# Patient Record
Sex: FEMALE | Race: BLACK OR AFRICAN AMERICAN | Employment: UNEMPLOYED | URBAN - METROPOLITAN AREA
[De-identification: names, ages, dates, MRNs, and addresses within clinical notes are randomized per-mention and may not be internally consistent; named-entity substitution may affect disease eponyms.]

---

## 2017-08-19 ENCOUNTER — OFFICE VISIT (OUTPATIENT)
Dept: FAMILY MEDICINE CLINIC | Age: 42
End: 2017-08-19

## 2017-08-19 VITALS
WEIGHT: 180 LBS | TEMPERATURE: 98.2 F | BODY MASS INDEX: 30.73 KG/M2 | HEIGHT: 64 IN | SYSTOLIC BLOOD PRESSURE: 105 MMHG | RESPIRATION RATE: 16 BRPM | DIASTOLIC BLOOD PRESSURE: 72 MMHG | OXYGEN SATURATION: 98 % | HEART RATE: 81 BPM

## 2017-08-19 DIAGNOSIS — J45.909 REACTIVE AIRWAY DISEASE, UNSPECIFIED ASTHMA SEVERITY, UNCOMPLICATED: ICD-10-CM

## 2017-08-19 DIAGNOSIS — J40 BRONCHITIS: Primary | ICD-10-CM

## 2017-08-19 RX ORDER — PREDNISONE 20 MG/1
20 TABLET ORAL 3 TIMES DAILY
Qty: 21 TAB | Refills: 0 | Status: SHIPPED | OUTPATIENT
Start: 2017-08-19 | End: 2019-05-28 | Stop reason: ALTCHOICE

## 2017-08-19 RX ORDER — BENZONATATE 200 MG/1
200 CAPSULE ORAL
Qty: 21 CAP | Refills: 0 | Status: SHIPPED | OUTPATIENT
Start: 2017-08-19 | End: 2017-08-26

## 2017-08-19 RX ORDER — LORATADINE 10 MG/1
10 TABLET ORAL
COMMUNITY
End: 2020-09-21 | Stop reason: ALTCHOICE

## 2017-08-19 RX ORDER — AZITHROMYCIN 250 MG/1
TABLET, FILM COATED ORAL
Qty: 6 TAB | Refills: 0 | Status: SHIPPED | OUTPATIENT
Start: 2017-08-19 | End: 2017-08-24

## 2017-08-19 NOTE — PROGRESS NOTES
HISTORY OF PRESENT ILLNESS  Nando Womack is a 35 y.o. female. HPI   This 35year old lady complains of a 1 week hx of cough productive of yellowish sputum. .  She is a smoker and has reactive airway disease. she has been wheezing sometimes but states the albuterol helps. No relief with otc meds    Review of Systems   Constitutional: Negative for fever. HENT: Negative for congestion. Respiratory: Positive for cough, sputum production and wheezing. Negative for shortness of breath. Cardiovascular: Negative for chest pain. Musculoskeletal: Negative for myalgias. Physical Exam   Constitutional: She appears well-developed and well-nourished. No distress. HENT:   Right Ear: External ear normal.   Left Ear: External ear normal.   Nose: Nose normal.   Mouth/Throat: Oropharynx is clear and moist. No oropharyngeal exudate. Eyes: Pupils are equal, round, and reactive to light. Cardiovascular: Normal rate, regular rhythm and normal heart sounds. No murmur heard. Pulmonary/Chest: Effort normal. She has wheezes (end exp wheezing with good air movement). She has no rales. Abdominal: Soft. There is no tenderness. Skin: She is not diaphoretic. ASSESSMENT and PLAN    ICD-10-CM ICD-9-CM    1. Bronchitis J40 490    2. Reactive airway disease, unspecified asthma severity, uncomplicated H25.711 824.68    will start on antibiotics-zpak  Prednisone  Albuterol.   Precautions given

## 2018-10-12 ENCOUNTER — HOSPITAL ENCOUNTER (EMERGENCY)
Age: 43
Discharge: HOME | End: 2018-10-12
Attending: STUDENT IN AN ORGANIZED HEALTH CARE EDUCATION/TRAINING PROGRAM
Payer: MEDICAID

## 2018-10-12 ENCOUNTER — APPOINTMENT (OUTPATIENT)
Dept: CT IMAGING | Age: 43
End: 2018-10-12
Attending: STUDENT IN AN ORGANIZED HEALTH CARE EDUCATION/TRAINING PROGRAM
Payer: MEDICAID

## 2018-10-12 VITALS
RESPIRATION RATE: 18 BRPM | HEIGHT: 64 IN | OXYGEN SATURATION: 100 % | BODY MASS INDEX: 32.1 KG/M2 | DIASTOLIC BLOOD PRESSURE: 98 MMHG | TEMPERATURE: 98.3 F | SYSTOLIC BLOOD PRESSURE: 179 MMHG | HEART RATE: 62 BPM | WEIGHT: 188 LBS

## 2018-10-12 DIAGNOSIS — R10.84 ABDOMINAL PAIN, GENERALIZED: Primary | ICD-10-CM

## 2018-10-12 LAB
ALBUMIN SERPL-MCNC: 3.4 G/DL (ref 3.5–5)
ALBUMIN/GLOB SERPL: 0.9 {RATIO} (ref 1.1–2.2)
ALP SERPL-CCNC: 53 U/L (ref 45–117)
ALT SERPL-CCNC: 18 U/L (ref 12–78)
AMPHET UR QL SCN: NEGATIVE
ANION GAP SERPL CALC-SCNC: 8 MMOL/L (ref 5–15)
APPEARANCE UR: CLEAR
AST SERPL-CCNC: 19 U/L (ref 15–37)
BACTERIA URNS QL MICRO: NEGATIVE /HPF
BARBITURATES UR QL SCN: NEGATIVE
BASOPHILS # BLD: 0.1 K/UL (ref 0–0.1)
BASOPHILS NFR BLD: 1 % (ref 0–1)
BENZODIAZ UR QL: POSITIVE
BILIRUB SERPL-MCNC: 0.3 MG/DL (ref 0.2–1)
BILIRUB UR QL: NEGATIVE
BUN SERPL-MCNC: 12 MG/DL (ref 6–20)
BUN/CREAT SERPL: 16 (ref 12–20)
CALCIUM SERPL-MCNC: 8.8 MG/DL (ref 8.5–10.1)
CANNABINOIDS UR QL SCN: POSITIVE
CHLORIDE SERPL-SCNC: 105 MMOL/L (ref 97–108)
CO2 SERPL-SCNC: 26 MMOL/L (ref 21–32)
COCAINE UR QL SCN: NEGATIVE
COLOR UR: ABNORMAL
COMMENT, HOLDF: NORMAL
CREAT SERPL-MCNC: 0.77 MG/DL (ref 0.55–1.02)
DIFFERENTIAL METHOD BLD: ABNORMAL
DRUG SCRN COMMENT,DRGCM: ABNORMAL
EOSINOPHIL # BLD: 0.2 K/UL (ref 0–0.4)
EOSINOPHIL NFR BLD: 3 % (ref 0–7)
EPITH CASTS URNS QL MICRO: ABNORMAL /LPF
ERYTHROCYTE [DISTWIDTH] IN BLOOD BY AUTOMATED COUNT: 14 % (ref 11.5–14.5)
GLOBULIN SER CALC-MCNC: 3.8 G/DL (ref 2–4)
GLUCOSE SERPL-MCNC: 89 MG/DL (ref 65–100)
GLUCOSE UR STRIP.AUTO-MCNC: NEGATIVE MG/DL
HCG UR QL: NEGATIVE
HCT VFR BLD AUTO: 39.1 % (ref 35–47)
HGB BLD-MCNC: 12.7 G/DL (ref 11.5–16)
HGB UR QL STRIP: ABNORMAL
IMM GRANULOCYTES # BLD: 0 K/UL (ref 0–0.04)
IMM GRANULOCYTES NFR BLD AUTO: 0 % (ref 0–0.5)
KETONES UR QL STRIP.AUTO: NEGATIVE MG/DL
LEUKOCYTE ESTERASE UR QL STRIP.AUTO: ABNORMAL
LIPASE SERPL-CCNC: 133 U/L (ref 73–393)
LYMPHOCYTES # BLD: 3.5 K/UL (ref 0.8–3.5)
LYMPHOCYTES NFR BLD: 53 % (ref 12–49)
MCH RBC QN AUTO: 27.8 PG (ref 26–34)
MCHC RBC AUTO-ENTMCNC: 32.5 G/DL (ref 30–36.5)
MCV RBC AUTO: 85.6 FL (ref 80–99)
METHADONE UR QL: NEGATIVE
MONOCYTES # BLD: 0.5 K/UL (ref 0–1)
MONOCYTES NFR BLD: 7 % (ref 5–13)
MUCOUS THREADS URNS QL MICRO: ABNORMAL /LPF
NEUTS SEG # BLD: 2.4 K/UL (ref 1.8–8)
NEUTS SEG NFR BLD: 36 % (ref 32–75)
NITRITE UR QL STRIP.AUTO: NEGATIVE
NRBC # BLD: 0 K/UL (ref 0–0.01)
NRBC BLD-RTO: 0 PER 100 WBC
OPIATES UR QL: POSITIVE
PCP UR QL: NEGATIVE
PH UR STRIP: 6 [PH] (ref 5–8)
PLATELET # BLD AUTO: 199 K/UL (ref 150–400)
PMV BLD AUTO: 12.9 FL (ref 8.9–12.9)
POTASSIUM SERPL-SCNC: 4.2 MMOL/L (ref 3.5–5.1)
PROT SERPL-MCNC: 7.2 G/DL (ref 6.4–8.2)
PROT UR STRIP-MCNC: NEGATIVE MG/DL
RBC # BLD AUTO: 4.57 M/UL (ref 3.8–5.2)
RBC #/AREA URNS HPF: ABNORMAL /HPF (ref 0–5)
SAMPLES BEING HELD,HOLD: NORMAL
SODIUM SERPL-SCNC: 139 MMOL/L (ref 136–145)
SP GR UR REFRACTOMETRY: 1.03 (ref 1–1.03)
UR CULT HOLD, URHOLD: NORMAL
UROBILINOGEN UR QL STRIP.AUTO: 1 EU/DL (ref 0.2–1)
WBC # BLD AUTO: 6.6 K/UL (ref 3.6–11)
WBC URNS QL MICRO: ABNORMAL /HPF (ref 0–4)

## 2018-10-12 PROCEDURE — 99284 EMERGENCY DEPT VISIT MOD MDM: CPT

## 2018-10-12 PROCEDURE — 80307 DRUG TEST PRSMV CHEM ANLYZR: CPT | Performed by: STUDENT IN AN ORGANIZED HEALTH CARE EDUCATION/TRAINING PROGRAM

## 2018-10-12 PROCEDURE — 83690 ASSAY OF LIPASE: CPT | Performed by: STUDENT IN AN ORGANIZED HEALTH CARE EDUCATION/TRAINING PROGRAM

## 2018-10-12 PROCEDURE — 96374 THER/PROPH/DIAG INJ IV PUSH: CPT

## 2018-10-12 PROCEDURE — 36415 COLL VENOUS BLD VENIPUNCTURE: CPT | Performed by: STUDENT IN AN ORGANIZED HEALTH CARE EDUCATION/TRAINING PROGRAM

## 2018-10-12 PROCEDURE — 81001 URINALYSIS AUTO W/SCOPE: CPT | Performed by: STUDENT IN AN ORGANIZED HEALTH CARE EDUCATION/TRAINING PROGRAM

## 2018-10-12 PROCEDURE — 96361 HYDRATE IV INFUSION ADD-ON: CPT

## 2018-10-12 PROCEDURE — 80053 COMPREHEN METABOLIC PANEL: CPT | Performed by: STUDENT IN AN ORGANIZED HEALTH CARE EDUCATION/TRAINING PROGRAM

## 2018-10-12 PROCEDURE — 74011250636 HC RX REV CODE- 250/636: Performed by: STUDENT IN AN ORGANIZED HEALTH CARE EDUCATION/TRAINING PROGRAM

## 2018-10-12 PROCEDURE — 81025 URINE PREGNANCY TEST: CPT

## 2018-10-12 PROCEDURE — 74177 CT ABD & PELVIS W/CONTRAST: CPT

## 2018-10-12 PROCEDURE — 74011636320 HC RX REV CODE- 636/320: Performed by: RADIOLOGY

## 2018-10-12 PROCEDURE — 85025 COMPLETE CBC W/AUTO DIFF WBC: CPT | Performed by: STUDENT IN AN ORGANIZED HEALTH CARE EDUCATION/TRAINING PROGRAM

## 2018-10-12 PROCEDURE — 74011250636 HC RX REV CODE- 250/636: Performed by: EMERGENCY MEDICINE

## 2018-10-12 PROCEDURE — 93005 ELECTROCARDIOGRAM TRACING: CPT

## 2018-10-12 RX ORDER — KETOROLAC TROMETHAMINE 30 MG/ML
30 INJECTION, SOLUTION INTRAMUSCULAR; INTRAVENOUS
Status: COMPLETED | OUTPATIENT
Start: 2018-10-12 | End: 2018-10-12

## 2018-10-12 RX ADMIN — SODIUM CHLORIDE 1000 ML: 900 INJECTION, SOLUTION INTRAVENOUS at 16:23

## 2018-10-12 RX ADMIN — KETOROLAC TROMETHAMINE 30 MG: 30 INJECTION, SOLUTION INTRAMUSCULAR at 18:34

## 2018-10-12 RX ADMIN — IOPAMIDOL 100 ML: 755 INJECTION, SOLUTION INTRAVENOUS at 20:09

## 2018-10-12 NOTE — ED PROVIDER NOTES
HPI Comments: 37 y.o. female with past medical history significant for anxiety and bipolar 1 disorder who presents via EMS from home with chief complaint of abdominal pain. Patient reports right side abdominal pain since 1 month ago. She was admitted to [facility] for 1 week and was discharged yesterday without pain medications. She currently complains of right sided abdominal pain that radiates to her left when she takes a deep breath. Patient also notes a little vaginal discharge. Patient was given 100 mcg of Fentanyl en route. Of note, patient has a history of cysts which were found by laparoscopies and states that she is concerned about a ruptured cyst. There are no other acute medical concerns at this time. Social hx: Current tobacco use; denies alcohol use; denies illicit drug use PCP: Anyi Canales MD 
 
Note written by Riya Juarez, as dictated by Debi Cohen MD 2:05 PM 
 
 
The history is provided by the patient and the EMS personnel. No  was used. Past Medical History:  
Diagnosis Date  Bipolar 1 disorder ([facility] [address]  Moderate anxiety Past Surgical History:  
Procedure Laterality Date  HX  SECTION    
 HX  SECTION    
 HX  SECTION No family history on file. Social History Social History  Marital status: UNKNOWN Spouse name: N/A  
 Number of children: N/A  
 Years of education: N/A Occupational History  Not on file. Social History Main Topics  Smoking status: Current Some Day Smoker Types: Cigarettes  Smokeless tobacco: Never Used  Alcohol use No  
 Drug use: No  
 Sexual activity: Not on file Other Topics Concern  Not on file Social History Narrative ALLERGIES: Codeine; Dilaudid [hydromorphone (pf)]; Pcn [penicillins]; and Sulfa (sulfonamide antibiotics) Review of Systems Constitutional: Negative for activity change, diaphoresis, fatigue and fever.  
HENT: Negative for congestion and sore throat. Eyes: Negative for photophobia and visual disturbance. Respiratory: Negative for chest tightness and shortness of breath. Cardiovascular: Negative for chest pain, palpitations and leg swelling. Gastrointestinal: Positive for abdominal pain. Negative for blood in stool, constipation, diarrhea, nausea and vomiting. Genitourinary: Positive for vaginal discharge. Negative for difficulty urinating, dysuria, flank pain, frequency and hematuria. Musculoskeletal: Negative for back pain. Neurological: Negative for dizziness, syncope, numbness and headaches. All other systems reviewed and are negative. Vitals:  
 10/12/18 1511 10/12/18 1514 BP:  128/72 Pulse:  62 Resp:  18 Temp:  98.3 °F (36.8 °C) SpO2:  100% Weight:  85.3 kg (188 lb) Height: 5' 5\" (1.651 m) 5' 4\" (1.626 m) Physical Exam  
Constitutional: She is oriented to person, place, and time. She appears well-developed and well-nourished. No distress. HENT:  
Head: Normocephalic and atraumatic. Nose: Nose normal.  
Mouth/Throat: Oropharynx is clear and moist. No oropharyngeal exudate. Eyes: Conjunctivae and EOM are normal. Pupils are equal, round, and reactive to light. Right eye exhibits no discharge. Left eye exhibits no discharge. No scleral icterus. Neck: Normal range of motion. Neck supple. No JVD present. No tracheal deviation present. No thyromegaly present. Cardiovascular: Normal rate, regular rhythm, normal heart sounds and intact distal pulses. Exam reveals no gallop and no friction rub. No murmur heard. Pulmonary/Chest: Effort normal and breath sounds normal. No stridor. No respiratory distress. She has no wheezes. She has no rales. She exhibits no tenderness. Abdominal: Soft. Bowel sounds are normal. She exhibits no distension and no mass. There is no tenderness. There is no rebound. Musculoskeletal: Normal range of motion. She exhibits no edema or tenderness. Lymphadenopathy:  
  She has no cervical adenopathy. Neurological: She is alert and oriented to person, place, and time. No cranial nerve deficit. Coordination normal.  
Skin: Skin is warm and dry. No rash noted. She is not diaphoretic. No erythema. No pallor. Psychiatric: She has a normal mood and affect. Her behavior is normal. Judgment and thought content normal.  
Nursing note and vitals reviewed. MDM Number of Diagnoses or Management Options Abdominal pain, generalized:  
  
Amount and/or Complexity of Data Reviewed Clinical lab tests: reviewed and ordered Tests in the radiology section of CPT®: ordered and reviewed Review and summarize past medical records: yes Discuss the patient with other providers: yes Independent visualization of images, tracings, or specimens: yes Risk of Complications, Morbidity, and/or Mortality Presenting problems: moderate Diagnostic procedures: moderate Management options: moderate Patient Progress Patient progress: stable ED Course Procedures 7:17 PM 
Change of shift. Care of patient taken over from Dr. Americo Mccoy to Dr. Fatemeh Randall; H&P reviewed, bedside handoff complete. Awaiting CT and discharge. 7:11 PM 
CT is ok, pt is angry that she is not getting stronger pain medication, will f/u with PCP and GYN. Discharge per Dr. Geraldine Kirkland. DISCHARGE NOTE 
7:14 PM 
Patient's results have been reviewed with them. Patient and/or family have verbally conveyed their understanding and agreement of the patient's signs, symptoms, diagnosis, treatment and prognosis and additionally agree to follow up as recommended or return to the Emergency Room should their condition change prior to follow-up.   Discharge instructions have also been provided to the patient with some educational information regarding their diagnosis as well a list of reasons why they would want to return to the ER prior to their follow-up appointment should their condition change.

## 2018-10-12 NOTE — DISCHARGE INSTRUCTIONS

## 2018-10-12 NOTE — ED TRIAGE NOTES
Pt went to [facility] ER last night with c/o pain in her lower right quadrant that radiates to her back. States has hx of fibroids and has been on Morphine since last Tuesday for it, stopped today. [facility] fibroids are not able to be seen with regular US.

## 2018-10-12 NOTE — ED NOTES
Pt screaming on call bell and can be heard screaming in the department at me on the call bell. The screaming was so loud I could not understand her on the call bell. Immediately notified Gaby Weeks  and we went into pt room. Pt proceeded to scream and yell and excessively verbally aggressive. Pt asked not to yell and pt continued to. Pt demanding pain medication. Pt demanding to see the dr. Eliud Rees physician currently with a critical pt. Pt made aware of situation of physician with critical pt, pt stated \"well my pain is more important then someone dying\". Pt continues to yell and demand the doctor to come in.

## 2018-10-12 NOTE — ED NOTES
Provider has reviewed discharge instructions with the patient. The patient verbalized understanding.

## 2018-10-14 LAB
ATRIAL RATE: 60 BPM
CALCULATED P AXIS, ECG09: 51 DEGREES
CALCULATED R AXIS, ECG10: 8 DEGREES
CALCULATED T AXIS, ECG11: 2 DEGREES
DIAGNOSIS, 93000: NORMAL
P-R INTERVAL, ECG05: 136 MS
Q-T INTERVAL, ECG07: 430 MS
QRS DURATION, ECG06: 68 MS
QTC CALCULATION (BEZET), ECG08: 430 MS
VENTRICULAR RATE, ECG03: 60 BPM

## 2018-11-15 ENCOUNTER — ANESTHESIA EVENT (OUTPATIENT)
Dept: ENDOSCOPY | Age: 43
End: 2018-11-15
Payer: MEDICAID

## 2018-11-16 ENCOUNTER — HOSPITAL ENCOUNTER (OUTPATIENT)
Age: 43
Setting detail: OUTPATIENT SURGERY
Discharge: HOME | End: 2018-11-16
Attending: INTERNAL MEDICINE | Admitting: INTERNAL MEDICINE
Payer: MEDICAID

## 2018-11-16 ENCOUNTER — ANESTHESIA (OUTPATIENT)
Dept: ENDOSCOPY | Age: 43
End: 2018-11-16
Payer: MEDICAID

## 2018-11-16 VITALS
RESPIRATION RATE: 15 BRPM | TEMPERATURE: 97.6 F | OXYGEN SATURATION: 100 % | SYSTOLIC BLOOD PRESSURE: 128 MMHG | DIASTOLIC BLOOD PRESSURE: 94 MMHG | HEART RATE: 70 BPM

## 2018-11-16 PROCEDURE — 77030027957 HC TBNG IRR ENDOGTR BUSS -B: Performed by: INTERNAL MEDICINE

## 2018-11-16 PROCEDURE — 76060000031 HC ANESTHESIA FIRST 0.5 HR: Performed by: INTERNAL MEDICINE

## 2018-11-16 PROCEDURE — 74011250636 HC RX REV CODE- 250/636

## 2018-11-16 PROCEDURE — 76040000019: Performed by: INTERNAL MEDICINE

## 2018-11-16 PROCEDURE — 77030009426 HC FCPS BIOP ENDOSC BSC -B: Performed by: INTERNAL MEDICINE

## 2018-11-16 PROCEDURE — 88305 TISSUE EXAM BY PATHOLOGIST: CPT

## 2018-11-16 RX ORDER — NALOXONE HYDROCHLORIDE 0.4 MG/ML
0.4 INJECTION, SOLUTION INTRAMUSCULAR; INTRAVENOUS; SUBCUTANEOUS
Status: DISCONTINUED | OUTPATIENT
Start: 2018-11-16 | End: 2018-11-16 | Stop reason: HOSPADM

## 2018-11-16 RX ORDER — FENTANYL CITRATE 50 UG/ML
100 INJECTION, SOLUTION INTRAMUSCULAR; INTRAVENOUS
Status: DISCONTINUED | OUTPATIENT
Start: 2018-11-16 | End: 2018-11-16 | Stop reason: HOSPADM

## 2018-11-16 RX ORDER — SODIUM CHLORIDE 9 MG/ML
INJECTION, SOLUTION INTRAVENOUS
Status: DISCONTINUED | OUTPATIENT
Start: 2018-11-16 | End: 2018-11-16 | Stop reason: HOSPADM

## 2018-11-16 RX ORDER — SODIUM CHLORIDE 9 MG/ML
50 INJECTION, SOLUTION INTRAVENOUS CONTINUOUS
Status: DISCONTINUED | OUTPATIENT
Start: 2018-11-16 | End: 2018-11-16 | Stop reason: HOSPADM

## 2018-11-16 RX ORDER — SODIUM CHLORIDE 0.9 % (FLUSH) 0.9 %
5-10 SYRINGE (ML) INJECTION AS NEEDED
Status: DISCONTINUED | OUTPATIENT
Start: 2018-11-16 | End: 2018-11-16 | Stop reason: HOSPADM

## 2018-11-16 RX ORDER — EPINEPHRINE 0.1 MG/ML
1 INJECTION INTRACARDIAC; INTRAVENOUS
Status: DISCONTINUED | OUTPATIENT
Start: 2018-11-16 | End: 2018-11-16 | Stop reason: HOSPADM

## 2018-11-16 RX ORDER — MIDAZOLAM HYDROCHLORIDE 1 MG/ML
.25-1 INJECTION, SOLUTION INTRAMUSCULAR; INTRAVENOUS
Status: DISCONTINUED | OUTPATIENT
Start: 2018-11-16 | End: 2018-11-16 | Stop reason: HOSPADM

## 2018-11-16 RX ORDER — PROPOFOL 10 MG/ML
INJECTION, EMULSION INTRAVENOUS AS NEEDED
Status: DISCONTINUED | OUTPATIENT
Start: 2018-11-16 | End: 2018-11-16 | Stop reason: HOSPADM

## 2018-11-16 RX ORDER — ALPRAZOLAM 0.5 MG/1
0.5 TABLET ORAL AS NEEDED
COMMUNITY

## 2018-11-16 RX ORDER — SODIUM CHLORIDE 0.9 % (FLUSH) 0.9 %
5-10 SYRINGE (ML) INJECTION EVERY 8 HOURS
Status: DISCONTINUED | OUTPATIENT
Start: 2018-11-16 | End: 2018-11-16 | Stop reason: HOSPADM

## 2018-11-16 RX ORDER — DEXTROMETHORPHAN/PSEUDOEPHED 2.5-7.5/.8
1.2 DROPS ORAL
Status: DISCONTINUED | OUTPATIENT
Start: 2018-11-16 | End: 2018-11-16 | Stop reason: HOSPADM

## 2018-11-16 RX ORDER — ATROPINE SULFATE 0.1 MG/ML
0.5 INJECTION INTRAVENOUS
Status: DISCONTINUED | OUTPATIENT
Start: 2018-11-16 | End: 2018-11-16 | Stop reason: HOSPADM

## 2018-11-16 RX ORDER — FLUMAZENIL 0.1 MG/ML
0.2 INJECTION INTRAVENOUS
Status: DISCONTINUED | OUTPATIENT
Start: 2018-11-16 | End: 2018-11-16 | Stop reason: HOSPADM

## 2018-11-16 RX ORDER — TRAMADOL HYDROCHLORIDE 100 MG/1
100 TABLET, FILM COATED, EXTENDED RELEASE ORAL
COMMUNITY
End: 2020-09-21

## 2018-11-16 RX ORDER — LIDOCAINE HYDROCHLORIDE 20 MG/ML
INJECTION, SOLUTION EPIDURAL; INFILTRATION; INTRACAUDAL; PERINEURAL AS NEEDED
Status: DISCONTINUED | OUTPATIENT
Start: 2018-11-16 | End: 2018-11-16 | Stop reason: HOSPADM

## 2018-11-16 RX ADMIN — PROPOFOL 40 MG: 10 INJECTION, EMULSION INTRAVENOUS at 13:58

## 2018-11-16 RX ADMIN — PROPOFOL 40 MG: 10 INJECTION, EMULSION INTRAVENOUS at 13:56

## 2018-11-16 RX ADMIN — SODIUM CHLORIDE: 9 INJECTION, SOLUTION INTRAVENOUS at 13:41

## 2018-11-16 RX ADMIN — LIDOCAINE HYDROCHLORIDE 30 MG: 20 INJECTION, SOLUTION EPIDURAL; INFILTRATION; INTRACAUDAL; PERINEURAL at 13:53

## 2018-11-16 RX ADMIN — PROPOFOL 40 MG: 10 INJECTION, EMULSION INTRAVENOUS at 14:00

## 2018-11-16 RX ADMIN — PROPOFOL 30 MG: 10 INJECTION, EMULSION INTRAVENOUS at 13:53

## 2018-11-16 RX ADMIN — PROPOFOL 90 MG: 10 INJECTION, EMULSION INTRAVENOUS at 13:51

## 2018-11-16 RX ADMIN — PROPOFOL 30 MG: 10 INJECTION, EMULSION INTRAVENOUS at 13:54

## 2018-11-16 NOTE — ANESTHESIA PREPROCEDURE EVALUATION
Anesthetic History No history of anesthetic complications Review of Systems / Medical History Patient summary reviewed, nursing notes reviewed and pertinent labs reviewed Pulmonary Smoker Asthma Neuro/Psych Psychiatric history Cardiovascular Within defined limits GI/Hepatic/Renal 
Within defined limits Endo/Other Within defined limits Other Findings Physical Exam 
 
Airway Mallampati: II 
TM Distance: > 6 cm Neck ROM: normal range of motion Mouth opening: Normal 
 
 Cardiovascular Regular rate and rhythm,  S1 and S2 normal,  no murmur, click, rub, or gallop Dental 
 
Dentition: Caps/crowns Pulmonary Breath sounds clear to auscultation Abdominal 
GI exam deferred Other Findings Anesthetic Plan ASA: 2 Anesthesia type: MAC Anesthetic plan and risks discussed with: Patient

## 2018-11-16 NOTE — H&P
[address] [facility], [place]. 
[address] [place], [address] 
[phone] History and Physical  
 
NAME: Randolph :  1975 MRN:  [id number] HPI:  The patient was seen and examined. Past Surgical History:  
Procedure Laterality Date  HX  SECTION    
 HX  SECTION    
 HX  SECTION Past Medical History:  
Diagnosis Date  Bipolar 1 disorder ([facility] [address]  Moderate anxiety Social History Tobacco Use  Smoking status: Current Some Day Smoker Types: Cigarettes  Smokeless tobacco: Never Used Substance Use Topics  Alcohol use: No  
  Alcohol/week: 0.0 oz  Drug use: No  
 
Allergies Allergen Reactions  Codeine Hives  Dilaudid [Hydromorphone (Pf)] Nausea and Vomiting  Pcn [Penicillins] Hives  Sulfa (Sulfonamide Antibiotics) Hives No family history on file. No current facility-administered medications for this encounter. Facility-Administered Medications Ordered in Other Encounters Medication Dose Route Frequency  0.9% sodium chloride infusion   IntraVENous CONTINUOUS  
 
 
 
PHYSICAL EXAM: 
General: WD, WN. Alert, cooperative, no acute distress   
HEENT: NC, Atraumatic. PERRLA, EOMI. Anicteric sclerae. Lungs:  CTA Bilaterally. No Wheezing/Rhonchi/Rales. Heart:  Regular  rhythm,  No murmur, No Rubs, No Gallops Abdomen: Soft, Non distended, Non tender.  +Bowel sounds, no HSM Extremities: No c/c/e Neurologic:  CN 2-12 gi, Alert and oriented X 3. No acute neurological distress Psych:   Good insight. Not anxious nor agitated. The heart, lungs and mental status were satisfactory for the administration of MAC sedation and for the procedure. Mallampati score: 2 Assessment: · Abdominal pain Plan:  
· Endoscopic procedure :EGd/colon · MAC sedation

## 2018-11-16 NOTE — ROUTINE PROCESS
Randolph 
1975 
[id number] Situation: 
Verbal report received from: [place] RN Procedure: Procedure(s): ESOPHAGOGASTRODUODENOSCOPY (EGD) ESOPHAGOGASTRODUODENAL (EGD) BIOPSY 
SIGMOIDOSCOPY FLEXIBLE Background: 
 
Preoperative diagnosis: DYSPHAGIA, PHARYNGOESOPHAGEAL PHASE Postoperative diagnosis: 1.- Gastritis :  Dr. Yoselin Remy Assistant(s): Endoscopy Technician-1: Barbra Kolb Endoscopy RN-1: Susan Morgan RN Specimens:  
ID Type Source Tests Collected by Time Destination 1 : Gastric Biopsy Preservative   Austin Robison MD 11/16/2018 1355 Pathology H. Pylori  no Assessment: 
Intra-procedure medications Anesthesia gave intra-procedure sedation and medications, see anesthesia flow sheet yes Intravenous fluids: NS@ [place] [facility] Vital signs stable Abdominal assessment: round and soft Recommendation: 
Discharge patient per MD order. Family or Friend Permission to share finding with family or friend yes

## 2018-11-16 NOTE — PROCEDURES
[address]  [address], [address]  [phone]                           Colonoscopy and EGD Procedure Note      Indications:  abdominal pain, father with colon caner at age 61     :  Travis Johnson MD    Referring Provider: Jabier Ferguson MD    Sedation:  MAC anesthesia    Procedure Details:  After informed consent was obtained with all risks and benefits of procedure explained and pre-operative exam completed, pt was placed in the left lateral decubitus position. Following sequential administration of sedation as per above, the gastroscope was inserted into the mouth and advanced under direct vision to second portion of the duodenum. A careful inspection was made as the gastroscope was withdrawn, including a retroflexed view of the proximal stomach; findings and interventions are described below. EGD Findings:  Esophagus:normal  Stomach:mild diffuse gastritis, biopsies obtained from antrum and body  Duodenum/jejunum:normal    EGD Interventions:  Gastric biopsies    The bed was then turned and upon sequential sedation as per above, a digital rectal exam was performed per below. The Olympus videocolonoscope was inserted in the rectum and carefully advanced to the rectum/sigmoid. The quality of preparation was poor. [place] Bowel Prep Score  / / / . The colonoscope was slowly withdrawn with careful evaluation between folds. Retroflexion in the rectum was performed. Colon Findings:   Rectum: formed stool  Sigmoid: formed stool    Colonoscopy Interventions:  none           Specimens Removed:    ID Type Source Tests Collected by Time Destination   1 : Gastric Biopsy Preservative   Judith Randall MD 11/16/2018 8433 Pathology       Complications: None. EBL:  None    Impression:    See Postoperative diagnosis above    Recommendations:   - Await pathology. You should receive a letter within 2 weeks.    - Resume normal medications.  - Recommend repeat colonoscopy in 6 months (it is very important that you have clear liquids the day prior to the prep and finish the entire prep). Discharge Disposition:  Home in the company of a  when able to ambulate.     René Cee MD  11/16/2018  2:05 PM

## 2018-11-16 NOTE — DISCHARGE INSTRUCTIONS
[address] [facility] [place].  [address] [facility] [address] [facility], [address]   [phone]                                  Priti Sheehan  [id number]  1975    It was my pleasure seeing you for your procedure. You will also receive a summary report with the findings from this procedure and any further recommendations. If you had polyps removed or biopsies taken during your procedure, you will receive a separate letter from me within the next 2 weeks. If you don't receive this letter or if you have any questions, please call my office [phone]. Please take note of the post procedure instructions listed below. Best Wishes,    Dr. Wood Johnston    These instructions give you information on caring for yourself after your procedure. Call your doctor if you have any problems or questions after your procedure. HOME CARE  · Walk if you have belly cramping or gas. Walking will help get rid of the air and reduce the bloated feeling in your belly (abdomen). · Your IV site (where you received drugs) may be tender to touch. Place warm towels on the site; keep your arm up on two pillows if you have any swelling or soreness in the area. · You may shower. ACTIVITY:  · Take frequent rest periods and move at a slower pace for the next 24 hours. .  · You may resume your regular activity tomorrow if you are feeling back to normal.  · Do not drive or ride a bicycle for at least 24 hours (because of the medicine (anesthesia) used during the test). · Do not sign any important legal documents or use or operate any machinery for 24 hours  · Do not take sleeping medicines/nerve drugs for 24 hours unless the doctor tells you. · You can return to work/school tomorrow unless otherwise instructed. NUTRITION:  · Drink plenty of fluids to keep your pee (urine) clear or pale yellow  · Begin with a light meal and progress to your normal diet.  Heavy or fried foods are harder to digest and may make you feel sick to your stomach (nauseated). · Once you are feeling back to normal, you may resume your normal diet as instructed by your doctor. · Avoid alcoholic beverages for 24 hours or as instructed. IF YOU HAD BIOPSIES TAKEN OR POLYPS REMOVED DURING THE PROCEDURE:  · For the next 7 days, avoid all non-steroidal antiinflammatory medications such as Ibuprofen, Motrin, Advil, Alleve, Shannan-seltzer, Goody's powder, BC powder. · If you do not have an heart condition that requires you to take a daily aspirin, you should avoid taking aspirin for 7 days. · Eat a soft diet for 24 hours. · Monitor your stools for any blood or dark black, tar-like, stools as this may be a sign of bleeding and if you see any blood, notify your doctor immediately. GET HELP RIGHT AWAY AND SEEK IMMEDIATE MEDICAL CARE IF:  · You have more than a spotting of blood in your stool. · You pass clumps of tissue (blood clots) or fill the toilet with blood. · Your belly is painfully swollen or puffy (abdominal distention). · You throw up (vomit). · You have a fever. · You have redness, pain or swelling at the IV site that last greater than two days. · You have abdominal pain or discomfort that is severe or gets worse throughout the day. Post-procedure diagnosis:  1.- Gastritis  Incomplete colonoscopy due to formed stool in rectum    Post-procedure recommendations:  - Await pathology. You should receive a letter within 2 weeks. - Resume normal medications.  - Recommend repeat colonoscopy in 6 months (it is very important that you have clear liquids the day prior to the prep and finish the entire prep). Gastritis: Care Instructions  Your Care Instructions    Gastritis is a sore and upset stomach. It happens when something irritates the stomach lining. Many things can cause it. These include an infection such as the flu or something you ate or drank.  Medicines or a sore on the lining of the stomach (ulcer) also can cause it. Your belly may bloat and ache. You may belch, vomit, and feel sick to your stomach. You should be able to relieve the problem by taking medicine. And it may help to change your diet. If gastritis lasts, your doctor may prescribe medicine. Follow-up care is a key part of your treatment and safety. Be sure to make and go to all appointments, and call your doctor if you are having problems. It's also a good idea to know your test results and keep a list of the medicines you take. How can you care for yourself at home? · If your doctor prescribed antibiotics, take them as directed. Do not stop taking them just because you feel better. You need to take the full course of antibiotics. · Be safe with medicines. If your doctor prescribed medicine to decrease stomach acid, take it as directed. Call your doctor if you think you are having a problem with your medicine. · Do not take any other medicine, including over-the-counter pain relievers, without talking to your doctor first.  · If your doctor recommends over-the-counter medicine to reduce stomach acid, such as Pepcid AC, Prilosec, Tagamet HB, or Zantac 75, follow the directions on the label. · Drink plenty of fluids (enough so that your urine is light yellow or clear like water) to prevent dehydration. Choose water and other caffeine-free clear liquids. If you have kidney, heart, or liver disease and have to limit fluids, talk with your doctor before you increase the amount of fluids you drink. · Limit how much alcohol you drink. · Avoid coffee, tea, cola drinks, chocolate, and other foods with caffeine. They increase stomach acid. When should you call for help? Call 911 anytime you think you may need emergency care.  For example, call if:    · You vomit blood or what looks like coffee grounds.     · You pass maroon or very bloody stools.    Call your doctor now or seek immediate medical care if:    · You start breathing fast and have not produced urine in the last 8 hours.     · You cannot keep fluids down.    Watch closely for changes in your health, and be sure to contact your doctor if:    · You do not get better as expected. Where can you learn more? Go to http://annie-jason.info/. Enter [id number] in the search box to learn more about \"Gastritis: Care Instructions. \"  Current as of: March 28, 2018  Content Version: 11.8  © 3295-4367 Healthwise, [employer]. Care instructions adapted under license by [employer] (which disclaims liability or warranty for this information). If you have questions about a medical condition or this instruction, always ask your healthcare professional. [address] any warranty or liability for your use of this information.

## 2018-11-16 NOTE — ANESTHESIA POSTPROCEDURE EVALUATION
Post-Anesthesia Evaluation and Assessment Patient: Jose Carlos Sweet MRN: [id number]  SSN: [id number] YOB: 1975  Age: 37 y.o. Sex: female I have evaluated the patient and they are stable and ready for discharge from the PACU. Cardiovascular Function/Vital Signs Visit Vitals BP (!) 128/94 Pulse 70 Temp 36.4 °C (97.6 °F) Resp 15 SpO2 100% Breastfeeding? No  
 
 
Patient is status post MAC anesthesia for Procedure(s): ESOPHAGOGASTRODUODENOSCOPY (EGD) ESOPHAGOGASTRODUODENAL (EGD) BIOPSY 
SIGMOIDOSCOPY FLEXIBLE. Nausea/Vomiting: None Postoperative hydration reviewed and adequate. Pain: 
Pain Scale 1: Numeric (0 - 10) (11/16/18 1426) Pain Intensity 1: 8 (11/16/18 1426) Managed Neurological Status: At baseline Mental Status, Level of Consciousness: Alert and  oriented to person, place, and time Pulmonary Status:  
O2 Device: Room air (11/16/18 1426) Adequate oxygenation and airway patent Complications related to anesthesia: None Post-anesthesia assessment completed. No concerns Signed By: Deepika Leach MD   
 November 16, 2018 Procedure(s): ESOPHAGOGASTRODUODENOSCOPY (EGD) ESOPHAGOGASTRODUODENAL (EGD) BIOPSY 
SIGMOIDOSCOPY FLEXIBLE. 
 
<BSHSIANPOST> Visit Vitals BP (!) 128/94 Pulse 70 Temp 36.4 °C (97.6 °F) Resp 15 SpO2 100% Breastfeeding?  No

## 2019-05-02 ENCOUNTER — TELEPHONE (OUTPATIENT)
Dept: FAMILY MEDICINE CLINIC | Age: 44
End: 2019-05-02

## 2019-05-02 ENCOUNTER — OFFICE VISIT (OUTPATIENT)
Dept: FAMILY MEDICINE CLINIC | Age: 44
End: 2019-05-02

## 2019-05-02 VITALS
DIASTOLIC BLOOD PRESSURE: 75 MMHG | TEMPERATURE: 98.3 F | OXYGEN SATURATION: 95 % | HEART RATE: 102 BPM | RESPIRATION RATE: 17 BRPM | BODY MASS INDEX: 33.46 KG/M2 | WEIGHT: 196 LBS | HEIGHT: 64 IN | SYSTOLIC BLOOD PRESSURE: 111 MMHG

## 2019-05-02 DIAGNOSIS — R56.9 SEIZURE (HCC): Primary | ICD-10-CM

## 2019-05-02 DIAGNOSIS — R00.0 TACHYCARDIA: ICD-10-CM

## 2019-05-02 NOTE — LETTER
NOTIFICATION RETURN TO WORK / SCHOOL 
 
5/2/2019 3:37 PM 
 
Ms. Jose Flores [address] To Whom It May Concern: 
 
Jose Flores is currently under the care of [facility]. She was seen today in the office 05/02/2019 If there are questions or concerns please have the patient contact our office.  
 
 
 
Sincerely, 
 
 
Anthony Moreno MD

## 2019-05-02 NOTE — PROGRESS NOTES
Chief Complaint   Patient presents with    Follow-up     Hysterectomy    Tremors     discuss tremors that have occured in the last 3 months since having hysterectomy     Seizure     patient noticed having seizures in the last 3 month since having hysterectomy      Blood pressure 111/75, pulse (!) 111, temperature 98.3 °F (36.8 °C), temperature source Oral, resp. rate 17, height 5' 4\" (1.626 m), weight 196 lb (88.9 kg), last menstrual period 10/16/2018, SpO2 95 %. 1. Have you been to the ER, urgent care clinic since your last visit? Hospitalized since your last visit? No    2. Have you seen or consulted any other health care providers outside of the [address] [place] [address] since your last visit? Include any pap smears or colon screening.  No

## 2019-05-02 NOTE — PATIENT INSTRUCTIONS

## 2019-05-02 NOTE — TELEPHONE ENCOUNTER
Patient have appt with below doctor 5/28/19 and asking if Dr. Charlie Pagan can get her sooner appt.     Office ph [phone]    Procedure Modifiers Provider Requested Approved   [id number] - REFERRAL TO NEUROLOGY None Grisel Cerda MD 1 1   Diagnosis Information     Diagnosis   R56.9 (ICD-10-CM) - [facility]     Pt ph [phone]
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
## 2019-05-02 NOTE — PROGRESS NOTES
\  [address] [facility] [address]   Office [phone], Fax [phone]      Subjective:     Chief Complaint   Patient presents with    Follow-up     Hysterectomy    Tremors     discuss tremors that have occured in the last 3 months since having hysterectomy     Seizure     patient noticed having seizures in the last 3 month since having hysterectomy      History provided by patient    HPI:  Randolph is a 37 y.o. [address] female with significant history of anxiety and Bipolar disorder  presenting for new onset tremors, orgasms and seizures s/p Hysterectomy in 12/2018. Patient stated right sided flank pain for over a year and has been in and out of the hospital until she had total hysterectomy with b/l oophorectomy ( per patient's report)  by at [facility].     Since then, patient is having tremors and sporadic orgasms. She returned back to her gynecologist who prescribed her estrogen. Patient stopped taking estrogen as it was affecting her mood. Subsequently, reported her first seziure episode at the end of February. This was witnessed by her mother and sister which presented us rolling back her eyes with full body shaking and loss of consciousness that lasted about 10 seconds. Denies tounge bitig or incontinence at that time. She did hit her head but did not seek medical attention at the time. Patient states that she continues to have spontaneous tremors, orgasms and seziures since then. Orgasms occur multiple times of a day especially when patient is awake. Seizures occurred 5 times since February and last episode was on April 20th ( about 10 days ago) with similar presentation mentioned above. Patient states that she has 4 kids and don't have time to go to doctors, that's why she did not go to the hospital sooner. She smokes 2 cigars per day. Denies starting new medication.      Dr. Candice Fitzpatrick- psychiatrist.       Review of Systems   Constitutional: Negative for chills and fever. Respiratory: Negative for shortness of breath. Cardiovascular: Negative for chest pain. Gastrointestinal: Negative for abdominal pain, nausea and vomiting. Neurological: Positive for tremors and seizures. Negative for sensory change, focal weakness and weakness. Psychiatric/Behavioral: The patient is nervous/anxious. Past Medical History:   Diagnosis Date    Bipolar 1 disorder ([address]     Moderate anxiety        Current Outpatient Medications on File Prior to Visit   Medication Sig Dispense Refill    ALPRAZolam (XANAX) 0.5 mg tablet Take 0.5 mg by mouth.  traMADol (ULTRAM-ER) 100 mg tablet Take 100 mg by mouth daily as needed for Pain (Every 4-5 hrs).  loratadine (CLARITIN) 10 mg tablet Take 10 mg by mouth.  QUEtiapine (SEROQUEL) 50 mg tablet Take 50 mg by mouth two (2) times a day.  albuterol sulfate (PROVENTIL;VENTOLIN) 2.5 mg/0.5 mL nebu nebulizer solution by Nebulization route once.  predniSONE (DELTASONE) 20 mg tablet Take 1 Tab by mouth three (3) times daily. 21 Tab 0    clonazePAM (KLONOPIN) 1 mg tablet Take  by mouth two (2) times a day.  POTASSIUM CHLORIDE (KLOR-CON 10 PO) Take  by mouth.  guaiFENesin SR (MUCINEX) 600 mg SR tablet Take 1 Tab by mouth two (2) times a day. 30 Tab 0     No current facility-administered medications on file prior to visit.         Allergies   Allergen Reactions    Codeine Hives    Dilaudid [Hydromorphone (Pf)] Nausea and Vomiting    Pcn [Penicillins] Hives    Sulfa (Sulfonamide Antibiotics) Hives       Past Surgical History:   Procedure Laterality Date    FLEXIBLE SIGMOIDOSCOPY N/A 2018    SIGMOIDOSCOPY FLEXIBLE performed by Venita Murdock [facility] at [facility] END[facility]COPY    HX  SECTION      HX  SECTION      HX  SECTION         Social History     Socioeconomic History    Marital status: UNKNOWN     Spouse name: Not on file    Number of children: Not on file    Years of education: Not on file    Highest education level: Not on file   Occupational History    Not on file   Social Needs    Financial resource strain: Not on file    Food insecurity:     Worry: Not on file     Inability: Not on file    Transportation needs:     Medical: Not on file     Non-medical: Not on file   Tobacco Use    Smoking status: Current Some Day Smoker     Types: Cigarettes    Smokeless tobacco: Never Used   Substance and Sexual Activity    Alcohol use: No     Alcohol/week: 0.0 oz    Drug use: No    Sexual activity: Not on file   Lifestyle    Physical activity:     Days per week: Not on file     Minutes per session: Not on file    Stress: Not on file   Relationships    Social connections:     Talks on phone: Not on file     Gets together: Not on file     Attends Yarsanism service: Not on file     Active member of club or organization: Not on file     Attends meetings of clubs or organizations: Not on file     Relationship status: Not on file    Intimate partner violence:     Fear of current or ex partner: Not on file     Emotionally abused: Not on file     Physically abused: Not on file     Forced sexual activity: Not on file   Other Topics Concern    Not on file   Social History Narrative    Not on file       There is no problem list on file for this patient. Objective:   Vitals - reviewed  Visit Vitals  /75   Pulse (!) 102   Temp 98.3 °F (36.8 °C) (Oral)   Resp 17   Ht 5' 4\" (1.626 m)   Wt 196 lb (88.9 kg)   LMP 10/16/2018 Comment: Tubal ligation 11/05/2009   SpO2 95%   BMI 33.64 kg/m²        Physical Exam   Constitutional: She is oriented to person, place, and time. She appears well-developed and well-nourished. HENT:   Head: Normocephalic and atraumatic. Eyes: Pupils are equal, round, and reactive to light. Conjunctivae and EOM are normal.   Neck: Neck supple. Cardiovascular: Normal rate, regular rhythm and normal heart sounds.    Pulmonary/Chest: Effort normal and breath sounds normal. No respiratory distress. Abdominal: Soft. Bowel sounds are normal.   Musculoskeletal: Normal range of motion. She exhibits no tenderness. Neurological: She is alert and oriented to person, place, and time. She has normal strength. No cranial nerve deficit or sensory deficit. She displays a negative Romberg sign. Psychiatric: Her mood appears anxious. She is inattentive. Assessment and orders:       ICD-10-CM ICD-9-CM    1. Seizure ([address] R56.9 780.39 REFERRAL TO NEUROLOGY   2. Tachycardia R00.0 785.0 TSH RFX ON ABNORMAL TO FREE T4     Diagnoses and all orders for this visit:    1. Seizure [facility]): During history and physical patient had spontaneous ? Involuntary generalized body tremor/shakking with eyes closed. Called help and two nurses arrived to assist. Patient continue to have involuntary shaking and unusual noises that lasted about a minute. On recovery, patient stated that she had one of her spontaneous orgasms with tears in her eyes. No post ictal state, no tongue biting or incontinence. Total of 3 episodes of these \"orgasms\" occurred while I was in the room. In regards of seizures: explained risks associated with newly onset seizures including putting her self and others in danger while she is driving. Recommenced immediate hospital follow up given new onset seizure with head trauma. Advised patient to stop driving. However, patient declined. Offered  Neurology follow up and patient accepted. -     REFERRAL TO NEUROLOGY    2. Tachycardia  -     TSH RFX ON ABNORMAL TO FREE T4          Pt was discussed seen with Dr. Jonathan Alvarez  (attending physician). I have reviewed patient medical and social history and medications. I have reviewed pertinent labs results and other data. I have discussed the diagnosis with the patient and the intended plan as seen in the above orders.  The patient has received an after-visit summary and questions were answered concerning future plans. I have discussed medication side effects and warnings with the patient as well.     Evelia Trimble MD  Resident [facility]  05/02/19

## 2019-05-03 ENCOUNTER — TELEPHONE (OUTPATIENT)
Dept: FAMILY MEDICINE CLINIC | Age: 44
End: 2019-05-03

## 2019-05-03 NOTE — TELEPHONE ENCOUNTER
Spoke to  at Neurology office to see if appointment can be scheduled sooner than 5/28/19.  states there are no sooner appointment avaliable than the patient's appointment scheduled on 5/21/19 with   Dr. Devante Pozo. Attempted to call patient to notify there are no sooner appointments available. Left voicemail for patient to return call.

## 2019-05-03 NOTE — TELEPHONE ENCOUNTER
Two patient identifiers confirmed (name and ). Notified patient office was called to see if there was any earlier available appointments than 19. Notified patient office did not have any earlier appointments available. Patient verbalized understanding.

## 2019-05-03 NOTE — TELEPHONE ENCOUNTER
----- Message from Marisol Alexis sent at 5/3/2019  1:16 PM EDT -----  Regarding: /Telephone  Pt returned call to the office (unsure of reason).  Best contact: [phone]

## 2019-05-03 NOTE — TELEPHONE ENCOUNTER
Attempted to speak with patient requestng if appointment with Neurology could be scheduled sooner. Phone call disconnected .

## 2019-05-22 ENCOUNTER — TELEPHONE (OUTPATIENT)
Dept: FAMILY MEDICINE CLINIC | Age: 44
End: 2019-05-22

## 2019-05-22 NOTE — TELEPHONE ENCOUNTER
Attempted to call patient regarding [facility] Neurology appointment scheduled with Dr. Heath Barton. Left voicemail for patient to return call. If patient return call neurology appointment has been scheduled for 5/28/19 with Dr. Heath Barton at 12:00pm at the   [facility] ) location.

## 2019-05-22 NOTE — TELEPHONE ENCOUNTER
Patient returning call to Adan Batista. Patient said to call [email or web address] first, [phone],  if not available call her work number provided. --[phone] x [phone], work number.

## 2019-05-22 NOTE — TELEPHONE ENCOUNTER
Patient called stating her Neurology appointment was provider cancelled, she was informed the next available would be the end of June. She would like to know if you can call to get her a sooner appointment. She asked that a vm be left when returning a call.

## 2019-05-22 NOTE — TELEPHONE ENCOUNTER
Two patient identifiers confirmed (name and ). Notified patient of scheduled Neurology appointment on 19 at 12:00pm with Dr. Dipti Edgar. Patient verbalized understanding. Patient requesting address of [facility] Clinic at the [place] location be left on voicemail. Voicemail left with [facility] Neurology address as requested by patient.

## 2019-05-28 ENCOUNTER — OFFICE VISIT (OUTPATIENT)
Dept: NEUROLOGY | Age: 44
End: 2019-05-28

## 2019-05-28 VITALS
SYSTOLIC BLOOD PRESSURE: 126 MMHG | TEMPERATURE: 98.2 F | HEART RATE: 84 BPM | BODY MASS INDEX: 33.46 KG/M2 | HEIGHT: 64 IN | DIASTOLIC BLOOD PRESSURE: 88 MMHG | OXYGEN SATURATION: 99 % | WEIGHT: 196 LBS | RESPIRATION RATE: 16 BRPM

## 2019-05-28 DIAGNOSIS — R40.20 LOC (LOSS OF CONSCIOUSNESS) (HCC): ICD-10-CM

## 2019-05-28 DIAGNOSIS — R56.9 SEIZURE (HCC): Primary | ICD-10-CM

## 2019-05-28 RX ORDER — DIPHENHYDRAMINE HCL 25 MG
25 TABLET ORAL
COMMUNITY

## 2019-05-28 NOTE — PROGRESS NOTES
Chief Complaint   Patient presents with    New Patient    Seizure     no hx of seizure prior to 2/24/19. pt fell after dizzy spell and hit head.   1st episode was witnessed, eyes rolled back and was convulsing.  had 3 more since but no episodes in 2 mo.  not sure if it has to do with medications or hitting head    Spasms     whole body will \"spaz\" for a brief moment

## 2019-05-28 NOTE — PROGRESS NOTES
[facility] and [address] at [facility] Neurology Clinics at [facility], [address] [facility] [address]  [phone] Office  [phone] Facsimile           Referring: Dr. Meraz Sayres   Patient presents with    New Patient    Seizure     49-year-old right-handed woman who presents today for evaluation of what she calls tremor seizure and fall. She tells me that she had just undergone a hysterectomy and had been on pain medication for about 6 months and decided that she was just going to stop taking that altogether. She is on Xanax for her psychiatric issues but she did not stop taking the Xanax. In any regard on February 19 she notes that she was having a hot flash and she went to jump on her bed and when she did she struck her head on the bedside table. She says she did not have any loss of consciousness with that but then immediately thereafter she said to have had an event that her family describes as a seizure. She was unresponsive. She had her eyes rolled up into her head and she was stiff and shaking for about 10 seconds. She then came right back. Her sister says that she has had 3-4 of these events in total since that time the last being 2 months ago. They are again all described as her being stiff trembling eyes up in her head and not responsive. Again last seconds. She has no lingual trauma. No loss of bowel or bladder. Nothing is changed since 2 months ago so she is not sure why she has not had any in the last 2 months. No chest pain palpitations. No shortness of breath. No preceding symptoms. Never had febrile convulsion. No other head trauma and no head trauma with loss of consciousness particularly. No family history of epilepsy. No fever or chills. No rash. No nausea. No vomiting. No headache. No visual disturbance.       Past Medical History:   Diagnosis Date    Bipolar 1 disorder [address] [address]     Moderate anxiety        Past Surgical History:   Procedure Laterality Date    FLEXIBLE SIGMOIDOSCOPY N/A 2018    SIGMOIDOSCOPY FLEXIBLE performed by Catalina [facility] at [facility]COPY    HX  SECTION      HX  SECTION      HX  SECTION         Current Outpatient Medications   Medication Sig Dispense Refill    ALPRAZolam (XANAX) 0.5 mg tablet Take 0.5 mg by mouth.  traMADol (ULTRAM-ER) 100 mg tablet Take 100 mg by mouth daily as needed for Pain (Every 4-5 hrs).  loratadine (CLARITIN) 10 mg tablet Take 10 mg by mouth.  predniSONE (DELTASONE) 20 mg tablet Take 1 Tab by mouth three (3) times daily. 21 Tab 0    clonazePAM (KLONOPIN) 1 mg tablet Take  by mouth two (2) times a day.  POTASSIUM CHLORIDE (KLOR-CON 10 PO) Take  by mouth.  QUEtiapine (SEROQUEL) 50 mg tablet Take 50 mg by mouth two (2) times a day.  albuterol sulfate (PROVENTIL;VENTOLIN) 2.5 mg/0.5 mL nebu nebulizer solution by Nebulization route once.  guaiFENesin SR (MUCINEX) 600 mg SR tablet Take 1 Tab by mouth two (2) times a day. 30 Tab 0        Allergies   Allergen Reactions    Codeine Hives    Dilaudid [Hydromorphone (Pf)] Nausea and Vomiting    Pcn [Penicillins] Hives    Sulfa (Sulfonamide Antibiotics) Hives       Social History     Tobacco Use    Smoking status: Current Some Day Smoker     Types: Cigarettes    Smokeless tobacco: Never Used   Substance Use Topics    Alcohol use: No     Alcohol/week: 0.0 oz    Drug use: No     History reviewed. No pertinent family history. Review of Systems  Pertinent positives and negatives as noted with remainder of comprehensive review negative    Examination  Visit Vitals  LMP 10/16/2018 Comment: Tubal ligation 2009     Pleasant, well appearing. Dress and grooming are appropriate. No scleral icterus is present. Oropharynx is clear.   Supple neck without bruit appreciated. Heart regular. Pulses are symmetrical.  No edema in the lower extremities. Neurologically, she is awake, alert, and oriented with normal speech and language. Her cognition is normal.    Intact cranial nerves 2-12. No nystagmus. Visual fields full to confrontation. Disk margins are flat bilaterally. She has normal bulk and tone. She has no abnormal movement. She has no pronation or drift. She generates full strength in the upper and lower extremities to direct confrontational testing. Reflexes are symmetrical in the upper and lower extremities bilaterally. Her toes are down bilaterally. No [place]. Finger nose finger and rapid alternating movements are normal.  Steady gait. No sensory deficit to primary modalities. Impression/Plan  78-year-old woman status post mild head trauma but subsequently with an event directly afterward described as a seizure and then recurrent events in question is whether this was related i.e. has she had some anatomic abnormality related to the head injury such as subdural versus intracerebral hemorrhage versus other or whether this is related to coming off of medications that she was taking etc.  At this juncture we will proceed with an MRI of the brain to evaluate for anatomic abnormalities that would predispose her to seizure. Routine sleep deprived EEG as well as carotid Doppler. Also need to consider given the briefness of these events transient arrhythmia and if the neurologic evaluation is unrevealing then consider cardiac eval.    Rosa Elmore MD    This note was created using voice recognition software. Despite editing, there may be syntax errors.

## 2019-05-28 NOTE — PATIENT INSTRUCTIONS
If you choose to go to imaging center outside of [place] [address], please be sure to bring imaging report and disc to follow up appointment. If we have ordered testing for you, know that; \"NO NEWS IS GOOD NEWS! \" It is our policy that we know longer call patients with results, nor do we  give test results over the phone. We schedule follow up appointments so that your results can be discussed in person. This allows you to address any questions you have regarding the results. If something of concern is revealed on your test, we will contact you to discuss the matter and if needed schedule a sooner follow up appointment. Additionally, results may be found by using the My Chart feature and one of our patient service representatives at the  can give you instructions on how to access this feature to utilize our electronic medical record system. Thank you for your understanding.

## 2019-05-29 ENCOUNTER — TELEPHONE (OUTPATIENT)
Dept: NEUROLOGY | Age: 44
End: 2019-05-29

## 2019-05-29 NOTE — TELEPHONE ENCOUNTER
Patient called requesting a note for work for the appt. Faxed /emailed  She had yesterday with Dr. Graciela Cowan. I told the patient that I didn't believe we could do that. patient left her work number [phone] [phone] I spoke with shashi to make sure I was correct. I called patient back to inform her that we were unable to send the note but that she could pick it up at the office. Then Ms. Jacqueline Carroll wants to speak with Estela Morgan

## 2019-05-30 NOTE — TELEPHONE ENCOUNTER
----- Message from Mery Kennedyesteban sent at 5/30/2019  9:45 AM EDT -----  Regarding: /Telephone  Pt called requesting a call back to cancel and reschedule EEG and doppler from Monday Spring 10 , preferable to June 21 , June 28 or July 12 . Pt best contact  Number is [phone] or desk number is [phone] . Dl Aguilar

## 2019-05-30 NOTE — TELEPHONE ENCOUNTER
Called and spoke with patient concerning note for work. The note is already written in connect care and she states she will pick it up when she comes in next month.

## 2019-06-22 ENCOUNTER — TELEPHONE (OUTPATIENT)
Dept: FAMILY MEDICINE CLINIC | Age: 44
End: 2019-06-22

## 2019-06-22 NOTE — TELEPHONE ENCOUNTER
Patient declined appt for Dr. Dileep Tanner Monday at 3:00pm adv can only come in during the evening. Adv evening clinic is for acute care only. Patient adv she will seek care elsewhere.
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
## 2020-01-03 ENCOUNTER — OFFICE VISIT (OUTPATIENT)
Dept: NEUROLOGY | Age: 45
End: 2020-01-03

## 2020-01-03 DIAGNOSIS — T74.22XS CONFIRMED VICTIM OF SEXUAL ABUSE IN CHILDHOOD, SEQUELA: ICD-10-CM

## 2020-01-03 DIAGNOSIS — R41.3 SHORT-TERM MEMORY LOSS: ICD-10-CM

## 2020-01-03 DIAGNOSIS — G31.84 MILD COGNITIVE IMPAIRMENT: Primary | ICD-10-CM

## 2020-01-03 DIAGNOSIS — R44.0 AUDITORY HALLUCINATION: ICD-10-CM

## 2020-01-03 DIAGNOSIS — R56.9 SEIZURE (HCC): ICD-10-CM

## 2020-01-03 DIAGNOSIS — R40.20 LOC (LOSS OF CONSCIOUSNESS) (HCC): ICD-10-CM

## 2020-01-03 DIAGNOSIS — R47.89 WORD FINDING DIFFICULTY: ICD-10-CM

## 2020-01-03 DIAGNOSIS — Z87.820 HISTORY OF CONCUSSION: ICD-10-CM

## 2020-01-03 DIAGNOSIS — R68.89 SPELLS OF DECREASED ATTENTIVENESS: ICD-10-CM
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
## 2020-01-31 ENCOUNTER — OFFICE VISIT (OUTPATIENT)
Dept: NEUROLOGY | Age: 45
End: 2020-01-31

## 2020-01-31 DIAGNOSIS — F41.8 ANXIETY WITH SOMATIC FEATURES: ICD-10-CM

## 2020-01-31 DIAGNOSIS — F33.3 DEPRESSION, MAJOR, RECURRENT, SEVERE WITH PSYCHOSIS (HCC): ICD-10-CM

## 2020-01-31 DIAGNOSIS — G31.84 MILD COGNITIVE IMPAIRMENT: Primary | ICD-10-CM

## 2020-01-31 DIAGNOSIS — R44.0 AUDITORY HALLUCINATION: ICD-10-CM

## 2020-01-31 DIAGNOSIS — R68.89 SPELLS OF DECREASED ATTENTIVENESS: ICD-10-CM

## 2020-01-31 DIAGNOSIS — T74.22XS CONFIRMED VICTIM OF SEXUAL ABUSE IN CHILDHOOD, SEQUELA: ICD-10-CM

## 2020-01-31 DIAGNOSIS — Z87.820 HISTORY OF CONCUSSION: ICD-10-CM

## 2020-01-31 DIAGNOSIS — F90.0 ATTENTION DEFICIT HYPERACTIVITY DISORDER (ADHD), INATTENTIVE TYPE, MODERATE: Chronic | ICD-10-CM

## 2020-01-31 DIAGNOSIS — F43.10 PTSD (POST-TRAUMATIC STRESS DISORDER): ICD-10-CM

## 2020-01-31 NOTE — LETTER
2/3/20 Patient: Randolph YOB: 1975 Date of Visit: 1/31/2020 MD Oliver [facility] [address] [facility] [address] [address] VIA In Basket Dear Cee Richard MD, Thank you for referring Ms. Paresh Hill to [facility] for evaluation. My notes for this consultation are attached. If you have questions, please do not hesitate to call me. I look forward to following your patient along with you. Sincerely, Nirav Silvestre PsyD
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
## 2020-03-19 ENCOUNTER — OFFICE VISIT (OUTPATIENT)
Dept: NEUROLOGY | Age: 45
End: 2020-03-19

## 2020-03-19 VITALS
OXYGEN SATURATION: 98 % | WEIGHT: 195.99 LBS | SYSTOLIC BLOOD PRESSURE: 122 MMHG | DIASTOLIC BLOOD PRESSURE: 84 MMHG | RESPIRATION RATE: 18 BRPM | HEIGHT: 64 IN | BODY MASS INDEX: 33.46 KG/M2 | HEART RATE: 89 BPM | TEMPERATURE: 97.9 F

## 2020-03-19 DIAGNOSIS — Z87.820 HISTORY OF CONCUSSION: ICD-10-CM

## 2020-03-19 DIAGNOSIS — R56.9 SEIZURE (HCC): ICD-10-CM

## 2020-03-19 DIAGNOSIS — F41.8 ANXIETY WITH SOMATIC FEATURES: Primary | ICD-10-CM

## 2020-03-19 RX ORDER — TRAZODONE HYDROCHLORIDE 50 MG/1
TABLET ORAL
COMMUNITY
Start: 2020-02-21

## 2020-03-19 RX ORDER — GABAPENTIN 100 MG/1
100 CAPSULE ORAL
COMMUNITY

## 2020-03-19 RX ORDER — HYDROXYZINE PAMOATE 50 MG/1
CAPSULE ORAL
COMMUNITY
Start: 2020-02-21

## 2020-03-19 NOTE — PROGRESS NOTES
New orders for EEGs and MRI placed and scheduled for pt, discussed in detail to have these done prior to f/u with Dr. Kvng Chun on 4/23/20, discussed in detail instructions for SD EEG
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
## 2020-03-19 NOTE — PROGRESS NOTES
Chief Complaint   Patient presents with    Spasms     Still has constant spasms during day lasting a couple of seconds.   Sometimes comes on like a chill, sometimes \"groin area and legs will lock\"    Very embarrassing in public as it looks as though she is having orgasm

## 2020-03-27 ENCOUNTER — OFFICE VISIT (OUTPATIENT)
Dept: NEUROLOGY | Age: 45
End: 2020-03-27

## 2020-03-27 DIAGNOSIS — R56.9 SEIZURE (HCC): Primary | ICD-10-CM

## 2020-03-27 DIAGNOSIS — F41.8 ANXIETY WITH SOMATIC FEATURES: ICD-10-CM

## 2020-03-27 DIAGNOSIS — Z87.820 HISTORY OF CONCUSSION: ICD-10-CM

## 2020-03-27 DIAGNOSIS — R44.0 AUDITORY HALLUCINATION: ICD-10-CM

## 2020-03-27 DIAGNOSIS — T74.22XS CONFIRMED VICTIM OF SEXUAL ABUSE IN CHILDHOOD, SEQUELA: ICD-10-CM

## 2020-03-27 DIAGNOSIS — F33.3 DEPRESSION, MAJOR, RECURRENT, SEVERE WITH PSYCHOSIS (HCC): ICD-10-CM

## 2020-03-27 DIAGNOSIS — F43.10 PTSD (POST-TRAUMATIC STRESS DISORDER): ICD-10-CM

## 2020-03-27 DIAGNOSIS — G31.84 MILD COGNITIVE IMPAIRMENT: ICD-10-CM

## 2020-03-27 DIAGNOSIS — R68.89 SPELLS OF DECREASED ATTENTIVENESS: ICD-10-CM

## 2020-03-27 NOTE — PROGRESS NOTES
This is a teleneuropsychology visit that was performed with in the originating site at patient's home and the distance site at [facility] outpatient clinic at [facility]. Verbal consent to participate in the video visit was obtained. This visit occurred during the corona (COVID -19) public health emergency and these visits were authorized by the President of the [place]. I discussed with the patient the nature of our teleneuropsych visit in that :  - I would evaluate the patient and recommend diagnostics and treatment based on my assessment and impressions, and/or provided test results and discussed these issues with the patient and/or family.    - Our sessions are not being recorded and that personal health information is protected    - Our team will provide follow-up care in person if when the patient needs it. Prior to seeing the patient I reviewed the records, including the previously completed report, the records in [place], and any updated visits from other providers since I saw the patient last.      Today, I engaged in a psychoeducational and supportive psychotherapy and feedback session with the patient vis teleneuropsychology. I reviewed the results of the recent Neuropsychological Evaluation, including discussing individual tests as well as patient's areas of neurocognitive strength versus weakness. We discussed, in detail, the following: This patient generated a predominantly normal range Neuropsychological Evaluation with respect to neurocognitive functioning. In this regard, the only impairments noted related to visual memory but her performance on that test was somewhat unusual. Visual attention is moderately impaired.    Otherwise, her performance across all other neurocognitive domains assessed, including mental status, verbal fluency, verbal auditory attention, nonverbal auditory attention, confrontation naming, auditory learning and memory, visual organization, working memory, processing speed, perceptual reasoning, verbal comprehension, executive functioning, and bilateral motor skills were within normal limits. Emotionally, there is support for severe depression with psychosis, severe anxiety with somatization, PTSD, and some borderline and schizotypical personality traits. Her anxiety is severe to the degree whereby her ability to attend and to concentrate is significantly compromised. Schizophrenia is a possibility here but there is much symptom overlap with the above. Psychiatric correlation is strongly advised.                   It is my opinion that the patient's reported (but not clinically corroborated) day-to-day memory problems are secondary to complex psychiatric distress. 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 I suggest consult with psychiatry for management of psychosis, depression, anxiety, and attention if not medically contraindicated. Active engagement in intensive psychotherapy is advised. Consider [address]. Consider EMDR. The spells she is having in which she can be orgasmic may well be psychogenic in etiology - certainly the evidence to date points to that etiology. I hope that the patient is reassured that there is little evidence of an organic brain problem involving memory. Stay active mentally, physically, and socially. Baseline now established. Follow up prn. Clinical correlation is, of course, indicated.                 I will discuss these findings with the patient when she follows up with me in the near future.   A follow up Neuropsychological Evaluation is indicated on a prn basis, especially if there are any cognitive and/or emotional changes.       DIAGNOSES:                         The Referral Diagnosis Of  Cognitive Difficulties - IS NOT SUPPORTED                                                  Major Depression - Severe, With Psychosis                                                  Anxiety Disorder - Severe, With Somatization PTSD, Complex                                                  Rule out Schizophrenia                                                  Rule out Mixed Borderline and Schizotypal Personality Disorder (versus combination of the above)                                                  Chronic ADHD- Inattentive, Moderate                                                  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 I also answered numerous questions related to the clinical findings, including discussing various methods to improve cognition and mood. Counseling provided regarding mood and cognition. CBT and supportive psychotherapy techniques were utilized. Supportive/Cognitive Behavioral/Solution Focused psychotherapy provided  Discussed rational versus irrational thinking patterns and their consequences. Discussed healthy/adaptive and unhealthy/maladaptive coping. The patient needs to follow with neuro and psych which she has.       The patient had the following concerns which I deferred to their referring provider: Med management      Time spent today:  25

## 2020-05-19 ENCOUNTER — HOSPITAL ENCOUNTER (OUTPATIENT)
Dept: MRI IMAGING | Age: 45
Discharge: HOME | End: 2020-05-19
Attending: PSYCHIATRY & NEUROLOGY
Payer: MEDICAID

## 2020-05-19 DIAGNOSIS — R56.9 SEIZURE (HCC): ICD-10-CM

## 2020-05-19 DIAGNOSIS — F41.8 ANXIETY WITH SOMATIC FEATURES: ICD-10-CM

## 2020-05-19 DIAGNOSIS — Z87.820 HISTORY OF CONCUSSION: ICD-10-CM

## 2020-05-19 PROCEDURE — 70551 MRI BRAIN STEM W/O DYE: CPT

## 2020-06-08 ENCOUNTER — OFFICE VISIT (OUTPATIENT)
Dept: NEUROLOGY | Age: 45
End: 2020-06-08

## 2020-06-08 VITALS — TEMPERATURE: 97.2 F

## 2020-06-08 DIAGNOSIS — R68.89 SPELLS OF DECREASED ATTENTIVENESS: Primary | ICD-10-CM

## 2020-06-08 NOTE — PROCEDURES
EEG:      Date:  06/08/20    Requesting Physician:  Baldo Blood. MD Howie    A sleep-deprived EEG is requested in this 53year-old with abnormal movements to evaluate for epileptiform abnormality. Medications:  Medications are said to include Xanax, Seroquel, Albuterol, Neurontin, Vistaril, Desyrel, Tramadol, Klonopin. This tracing is obtained during the awake state, although the technologist does indicate the patient is sleep deprived. During wakefulness there are intermittent runs of posteriorly-dominant and symmetric low-to-medium amplitude 10-11 cycle per second activities which attenuate with eye opening. Lower-voltage faster-frequency activities are seen symmetrically over the anterior head regions. During the tracing there are two episodes of reported stiffening of the muscles with no electrographic correlate. Hyperventilation is not performed secondary to COVID-19 precautions. Intermittent photic stimulation is aborted secondary to patient discomfort. Sleep is not attained. Interpretation:   This sleep-deprived EEG recorded during the awake state is normal.

## 2020-07-16 ENCOUNTER — TELEPHONE (OUTPATIENT)
Dept: NEUROLOGY | Age: 45
End: 2020-07-16

## 2020-07-16 DIAGNOSIS — R68.89 SPELLS OF DECREASED ATTENTIVENESS: Primary | ICD-10-CM

## 2020-07-16 DIAGNOSIS — F41.8 ANXIETY WITH SOMATIC FEATURES: ICD-10-CM

## 2020-07-16 DIAGNOSIS — Z87.820 HISTORY OF CONCUSSION: ICD-10-CM

## 2020-07-16 DIAGNOSIS — R56.9 SEIZURE (HCC): ICD-10-CM

## 2020-07-16 NOTE — TELEPHONE ENCOUNTER
Pt is requesting a call back in ref to needing any other additional testing before setting up a follow up appt to go over last EEG

## 2020-07-20 DIAGNOSIS — F41.8 ANXIETY WITH SOMATIC FEATURES: ICD-10-CM

## 2020-07-20 DIAGNOSIS — R68.89 SPELLS OF DECREASED ATTENTIVENESS: ICD-10-CM

## 2020-07-20 DIAGNOSIS — R56.9 SEIZURE (HCC): ICD-10-CM

## 2020-07-20 DIAGNOSIS — Z87.820 HISTORY OF CONCUSSION: ICD-10-CM

## 2020-07-20 NOTE — TELEPHONE ENCOUNTER
Pt was supposed to have routine and SD eegs.   SD scheduled for 7:45 am on 8/6/20 and f/u with Dr. Miguel Felipe scheduled for  Thursday, August 20, 2020 09:20 AM

## 2020-08-05 ENCOUNTER — TELEPHONE (OUTPATIENT)
Dept: NEUROLOGY | Age: 45
End: 2020-08-05

## 2020-08-05 NOTE — TELEPHONE ENCOUNTER
----- Message from Melony Carrera sent at 8/5/2020  3:28 PM EDT -----  Regarding: Dr. Rock Beck Message/Vendor Calls    Caller's first and last name: Patient      Reason for call: Confirm appointment      Callback required yes/no and why: Yes. To advise       Best contact number(s): [phone]      Details to clarify the request: Please contact patient to confirm appointment. She was cleared of the covid screening over the phone.  She is just returning from [facility] and wants to be sure she won't be denied access in the morning      Nunu Carrera

## 2020-08-06 ENCOUNTER — OFFICE VISIT (OUTPATIENT)
Dept: NEUROLOGY | Age: 45
End: 2020-08-06
Payer: MEDICAID

## 2020-08-06 VITALS — TEMPERATURE: 97 F

## 2020-08-06 DIAGNOSIS — R56.9 WITNESSED SEIZURE-LIKE ACTIVITY (HCC): ICD-10-CM

## 2020-08-06 PROCEDURE — 95816 EEG AWAKE AND DROWSY: CPT | Performed by: PSYCHIATRY & NEUROLOGY

## 2020-08-07 ENCOUNTER — OFFICE VISIT (OUTPATIENT)
Dept: NEUROLOGY | Age: 45
End: 2020-08-07
Payer: MEDICAID

## 2020-08-07 DIAGNOSIS — F43.10 PTSD (POST-TRAUMATIC STRESS DISORDER): ICD-10-CM

## 2020-08-07 DIAGNOSIS — F41.8 ANXIETY WITH SOMATIC FEATURES: ICD-10-CM

## 2020-08-07 DIAGNOSIS — T74.22XS CONFIRMED VICTIM OF SEXUAL ABUSE IN CHILDHOOD, SEQUELA: ICD-10-CM

## 2020-08-07 DIAGNOSIS — R56.9 WITNESSED SEIZURE-LIKE ACTIVITY (HCC): Primary | ICD-10-CM

## 2020-08-07 DIAGNOSIS — R68.89 SPELLS OF DECREASED ATTENTIVENESS: ICD-10-CM

## 2020-08-07 DIAGNOSIS — Z87.820 HISTORY OF CONCUSSION: ICD-10-CM

## 2020-08-07 DIAGNOSIS — G31.84 MILD COGNITIVE IMPAIRMENT: ICD-10-CM

## 2020-08-07 DIAGNOSIS — F90.0 ATTENTION DEFICIT HYPERACTIVITY DISORDER (ADHD), INATTENTIVE TYPE, MODERATE: ICD-10-CM

## 2020-08-07 DIAGNOSIS — R56.9 SEIZURE (HCC): ICD-10-CM

## 2020-08-07 DIAGNOSIS — R44.0 AUDITORY HALLUCINATION: ICD-10-CM

## 2020-08-07 DIAGNOSIS — F33.3 DEPRESSION, MAJOR, RECURRENT, SEVERE WITH PSYCHOSIS (HCC): ICD-10-CM

## 2020-08-07 PROCEDURE — 90832 PSYTX W PT 30 MINUTES: CPT | Performed by: CLINICAL NEUROPSYCHOLOGIST

## 2020-08-07 NOTE — PROCEDURES
EEG:      Date:  08/06/20    Requesting Physician:  Lizzie Rojas. MD Howie    A sleep-deprived EEG is requested in this 78-year-old woman with episodes of seizure-like activity to evaluate for epileptiform abnormality. Medications:  Medications are said to include Xanax, Klonopin, Neurontin, Seroquel, Ultram, Desyrel and Albuterol. This tracing is obtained during the awake state. During wakefulness there are intermittent runs of posteriorly-dominant and symmetric low-to-medium amplitude 10 cycle per second activities which attenuate with eye opening. Lower-voltage faster-frequency activities are seen symmetrically over the anterior head regions. During the tracing the patient has an episode. She asks the technologist, \"What is going on?\". She then starts to grimace and close her eyes. She will grit her teeth. She has random movements of her extremities. She will arch her back and push her head into the stretcher. During this time there is muscle artifact and through the quieter portions, normal waking rhythm. This event continues on for several minutes. The patient indicated that the photic stimulation triggered her spasm. The patient has another such event a moment or two later. She is seen grabbing the stretcher with her right hand. Left hand is at her side. Her trunk/torso moves from side to side. She will start heaving her thorax up and down and has variable movements about her legs. Side to side and up and down movement with arching of her back. Again, muscle artifact is seen and normal waking rhythms through the non-obscured muscular portions. Hyperventilation is not performed secondary to COVID-19 precautions. Intermittent photic stimulation is aborted secondary to the patient's episodes. Interpretation:   This sleep-deprived EEG recorded during the awake state is normal.  The three episodes exhibited by the patient have no electrographic correlation and are consistent with nonepileptic events.
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
## 2020-08-12 ENCOUNTER — TELEPHONE (OUTPATIENT)
Dept: NEUROLOGY | Age: 45
End: 2020-08-12

## 2020-08-12 NOTE — TELEPHONE ENCOUNTER
----- Message from Alexys White sent at 8/12/2020  8:30 AM EDT -----  Regarding: Dr. Radha Robles  Pt would like a call back regarding scheduling an appt.  Contact is [phone]

## 2020-08-12 NOTE — TELEPHONE ENCOUNTER
Called and spoke with patient and she stated that she would prefer to see one of the NP as she did not feel that their was enough concern about the situation when she got her EEG and she was having seizure like symptoms. Appt. Scheduled with Jean Conteh on 9/10/20. Acknowledged patient frustration and patient was thankful for appt.  With NP

## 2020-09-21 ENCOUNTER — OFFICE VISIT (OUTPATIENT)
Dept: NEUROLOGY | Age: 45
End: 2020-09-21
Payer: MEDICAID

## 2020-09-21 VITALS
SYSTOLIC BLOOD PRESSURE: 132 MMHG | WEIGHT: 223 LBS | DIASTOLIC BLOOD PRESSURE: 76 MMHG | HEART RATE: 92 BPM | BODY MASS INDEX: 38.07 KG/M2 | OXYGEN SATURATION: 98 % | HEIGHT: 64 IN | RESPIRATION RATE: 17 BRPM

## 2020-09-21 DIAGNOSIS — F43.10 PTSD (POST-TRAUMATIC STRESS DISORDER): ICD-10-CM

## 2020-09-21 DIAGNOSIS — F41.8 ANXIETY WITH SOMATIC FEATURES: ICD-10-CM

## 2020-09-21 DIAGNOSIS — F33.3 DEPRESSION, MAJOR, RECURRENT, SEVERE WITH PSYCHOSIS (HCC): ICD-10-CM

## 2020-09-21 DIAGNOSIS — E66.01 SEVERE OBESITY (HCC): ICD-10-CM

## 2020-09-21 DIAGNOSIS — R56.9 WITNESSED SEIZURE-LIKE ACTIVITY (HCC): ICD-10-CM

## 2020-09-21 DIAGNOSIS — F44.5 PSYCHOGENIC NONEPILEPTIC SEIZURE: Primary | ICD-10-CM

## 2020-09-21 DIAGNOSIS — R44.0 AUDITORY HALLUCINATION: ICD-10-CM

## 2020-09-21 DIAGNOSIS — T74.22XS CONFIRMED VICTIM OF SEXUAL ABUSE IN CHILDHOOD, SEQUELA: ICD-10-CM

## 2020-09-21 PROCEDURE — 99214 OFFICE O/P EST MOD 30 MIN: CPT | Performed by: NURSE PRACTITIONER

## 2020-09-21 RX ORDER — QUETIAPINE FUMARATE 300 MG/1
TABLET, FILM COATED ORAL
COMMUNITY
Start: 2020-08-17

## 2020-09-21 RX ORDER — LIDOCAINE 50 MG/G
PATCH TOPICAL
COMMUNITY
Start: 2020-09-01

## 2020-09-21 RX ORDER — MONTELUKAST SODIUM 10 MG/1
TABLET ORAL
COMMUNITY
Start: 2020-09-01
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
## 2020-09-30 ENCOUNTER — OFFICE VISIT (OUTPATIENT)
Dept: NEUROLOGY | Age: 45
End: 2020-09-30
Payer: MEDICAID

## 2020-09-30 VITALS — TEMPERATURE: 97.5 F

## 2020-09-30 DIAGNOSIS — F33.3 DEPRESSION, MAJOR, RECURRENT, SEVERE WITH PSYCHOSIS (HCC): ICD-10-CM

## 2020-09-30 DIAGNOSIS — F44.5 PSYCHOGENIC NONEPILEPTIC SEIZURE: Primary | ICD-10-CM

## 2020-09-30 DIAGNOSIS — R56.9 WITNESSED SEIZURE-LIKE ACTIVITY (HCC): ICD-10-CM

## 2020-09-30 DIAGNOSIS — F43.10 PTSD (POST-TRAUMATIC STRESS DISORDER): ICD-10-CM

## 2020-09-30 DIAGNOSIS — F41.8 ANXIETY WITH SOMATIC FEATURES: ICD-10-CM

## 2020-09-30 DIAGNOSIS — R44.0 AUDITORY HALLUCINATION: ICD-10-CM

## 2020-09-30 PROCEDURE — 90832 PSYTX W PT 30 MINUTES: CPT | Performed by: CLINICAL NEUROPSYCHOLOGIST
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
## 2021-01-20 ENCOUNTER — OFFICE VISIT (OUTPATIENT)
Dept: NEUROLOGY | Age: 46
End: 2021-01-20
Payer: MEDICAID

## 2021-01-20 DIAGNOSIS — F33.3 DEPRESSION, MAJOR, RECURRENT, SEVERE WITH PSYCHOSIS (HCC): ICD-10-CM

## 2021-01-20 DIAGNOSIS — R56.9 WITNESSED SEIZURE-LIKE ACTIVITY (HCC): ICD-10-CM

## 2021-01-20 DIAGNOSIS — E66.01 SEVERE OBESITY (HCC): ICD-10-CM

## 2021-01-20 DIAGNOSIS — R44.0 AUDITORY HALLUCINATION: ICD-10-CM

## 2021-01-20 DIAGNOSIS — F44.5 PSYCHOGENIC NONEPILEPTIC SEIZURE: Primary | ICD-10-CM

## 2021-01-20 DIAGNOSIS — R68.89 SPELLS OF DECREASED ATTENTIVENESS: ICD-10-CM

## 2021-01-20 DIAGNOSIS — F41.8 ANXIETY WITH SOMATIC FEATURES: ICD-10-CM

## 2021-01-20 PROCEDURE — 90791 PSYCH DIAGNOSTIC EVALUATION: CPT | Performed by: CLINICAL NEUROPSYCHOLOGIST

## 2021-01-20 NOTE — LETTER
1/20/2021 Patient: Randolph YOB: 1975 Date of Visit: 1/20/2021 Sanjuanita Tanner MD 
[address] [place] [phone] Via Fax: [phone] Dear Sanjuanita Tanner MD, Thank you for referring Ms. Avelino Flores to [facility] for evaluation. My notes for this consultation are attached. If you have questions, please do not hesitate to call me. I look forward to following your patient along with you. Sincerely, Calvin Beyer PsyD
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
## 2022-03-20 PROBLEM — E66.01 SEVERE OBESITY (HCC): Status: ACTIVE | Noted: 2020-09-21

## 2023-05-17 RX ORDER — LIDOCAINE 50 MG/G
PATCH TOPICAL
COMMUNITY
Start: 2020-09-01

## 2023-05-17 RX ORDER — ALPRAZOLAM 0.5 MG/1
0.5 TABLET ORAL PRN
COMMUNITY

## 2023-05-17 RX ORDER — GABAPENTIN 100 MG/1
100 CAPSULE ORAL 3 TIMES DAILY PRN
COMMUNITY

## 2023-05-17 RX ORDER — MONTELUKAST SODIUM 10 MG/1
TABLET ORAL
COMMUNITY
Start: 2020-09-01

## 2023-05-17 RX ORDER — TRAZODONE HYDROCHLORIDE 50 MG/1
1 TABLET ORAL NIGHTLY PRN
COMMUNITY
Start: 2020-02-21

## 2023-05-17 RX ORDER — HYDROXYZINE PAMOATE 50 MG/1
CAPSULE ORAL
COMMUNITY
Start: 2020-02-21

## 2023-05-17 RX ORDER — GUAIFENESIN 600 MG/1
600 TABLET, EXTENDED RELEASE ORAL 2 TIMES DAILY
COMMUNITY
Start: 2015-08-04

## 2023-05-17 RX ORDER — QUETIAPINE FUMARATE 300 MG/1
TABLET, FILM COATED ORAL
COMMUNITY
Start: 2020-08-17

## (undated) DEVICE — BAG BELONG PT PERS CLEAR HANDL

## (undated) DEVICE — SYRINGE MED 20ML STD CLR PLAS LUERLOCK TIP N CTRL DISP

## (undated) DEVICE — BITE BLK ENDOSCP AD 54FR GRN POLYETH ENDOSCP W STRP SLD

## (undated) DEVICE — SOLIDIFIER FLUID 3000 CC ABSORB

## (undated) DEVICE — Z DISCONTINUED USE 2751540 TUBING IRRIG L10IN DISP PMP ENDOGATOR

## (undated) DEVICE — AIRLIFE™ U/CONNECT-IT OXYGEN TUBING 7 FEET (2.1 M) CRUSH-RESISTANT OXYGEN TUBING, VINYL TIPPED: Brand: AIRLIFE™

## (undated) DEVICE — SYR 50ML SLIP TIP NSAF LF STRL --

## (undated) DEVICE — WRISTBAND ID AD W1XL11.5IN RED POLY ALRG PREPRINTED PERM

## (undated) DEVICE — QUILTED PREMIUM COMFORT UNDERPAD,EXTRA HEAVY: Brand: WINGS

## (undated) DEVICE — TUBING O2 PED L13FT NSL ORAL PT SAMP LN NONINTUBATED SMRT

## (undated) DEVICE — KENDALL RADIOLUCENT FOAM MONITORING ELECTRODE -RECTANGULAR SHAPE: Brand: KENDALL

## (undated) DEVICE — CANN NASAL O2 CAPNOGRAPHY AD -- FILTERLINE

## (undated) DEVICE — ENDO CARRY-ON PROCEDURE KIT INCLUDES ENZYMATIC SPONGE, GAUZE, BIOHAZARD LABEL, TRAY, LUBRICANT, DIRTY SCOPE LABEL, WATER LABEL, TRAY, DRAWSTRING PAD, AND DEFENDO 4-PIECE KIT.: Brand: ENDO CARRY-ON PROCEDURE KIT

## (undated) DEVICE — BW-412T DISP COMBO CLEANING BRUSH: Brand: SINGLE USE COMBINATION CLEANING BRUSH

## (undated) DEVICE — KIT IV STRT W CHLORAPREP PD 1ML

## (undated) DEVICE — CONTAINER SPEC 20 ML LID NEUT BUFF FORMALIN 10 % POLYPR STS

## (undated) DEVICE — (D)CUP DENT 7.5OZ PLAS DSTY -- DISC BY MFR USE ITEM 341725

## (undated) DEVICE — CONNECTOR TBNG AUX H2O JET DISP FOR OLY 160/180 SER

## (undated) DEVICE — CATH IV AUTOGRD BC BLU 22GA 25 -- INSYTE

## (undated) DEVICE — NEEDLE HYPO 18GA L1.5IN PNK S STL HUB POLYPR SHLD REG BVL

## (undated) DEVICE — 1200 GUARD II KIT W/5MM TUBE W/O VAC TUBE: Brand: GUARDIAN

## (undated) DEVICE — Z DISCONTINUED NO SUB IDED SET EXTN W/ 4 W STPCOCK M SPIN LOK 36IN

## (undated) DEVICE — FORCEPS BX L240CM JAW DIA2.8MM L CAP W/ NDL MIC MESH TOOTH

## (undated) DEVICE — BAG SPEC BIOHZD LF 2MIL 6X10IN -- CONVERT TO ITEM 357326

## (undated) DEVICE — Z CONVERTED USE 2274299 CUFF BLD PRESSURE LNG MED AD 25-35 CM ARM FLEXIPORT DISP

## (undated) DEVICE — SET ADMIN 16ML TBNG L100IN 2 Y INJ SITE IV PIGGY BK DISP